# Patient Record
Sex: MALE | Race: WHITE | NOT HISPANIC OR LATINO | URBAN - NONMETROPOLITAN AREA
[De-identification: names, ages, dates, MRNs, and addresses within clinical notes are randomized per-mention and may not be internally consistent; named-entity substitution may affect disease eponyms.]

---

## 2015-11-05 PROBLEM — H52.12: Noted: 2019-10-03

## 2019-04-11 NOTE — PATIENT DISCUSSION
New Prescription: Maxitrol (neomycin-polymyxin-dexameth): ointment: 3.5-10,000-0.1 mg-unit/g-% a small amount three times a day as directed into left eye 04-

## 2019-10-03 ENCOUNTER — IMPORTED ENCOUNTER (OUTPATIENT)
Dept: URBAN - NONMETROPOLITAN AREA CLINIC 1 | Facility: CLINIC | Age: 64
End: 2019-10-03

## 2019-10-03 PROCEDURE — 92014 COMPRE OPH EXAM EST PT 1/>: CPT

## 2019-10-03 PROCEDURE — 92015 DETERMINE REFRACTIVE STATE: CPT

## 2019-10-03 NOTE — PATIENT DISCUSSION
PVD OU - Discussed findings of exam in detail with the patient. - The risk of retinal detachment in patients with PVDs was discussed with the patient and the warning signs of retinal detachment were carefully reviewed with the patient. - The patient was warned to return to the office or contact the ophthalmologist on call immediately if they experience signs of retinal detachment or changes noted in vision from today.  - Continue to monitor Cataracts OU - Discussed diagnosis in detail with patient- Discussed signs and symptoms associated - Recommend UV protection - Not visually significant at this time no treatment needed- Continue to monitor Myopia/Astigmatism/Presbyopia OU- Discussed refractive status in detail with patient- New glasses Rx given today- Continue to monitor; 's Notes: MR 10/03/2019

## 2020-10-06 NOTE — PATIENT DISCUSSION
- Follow up in 1 year for [facility] SERVICES OF [facility], MRx

## 2021-07-12 ENCOUNTER — IMPORTED ENCOUNTER (OUTPATIENT)
Dept: URBAN - NONMETROPOLITAN AREA CLINIC 1 | Facility: CLINIC | Age: 66
End: 2021-07-12

## 2021-07-12 PROCEDURE — 92015 DETERMINE REFRACTIVE STATE: CPT

## 2021-07-12 PROCEDURE — 92014 COMPRE OPH EXAM EST PT 1/>: CPT

## 2021-07-12 NOTE — PATIENT DISCUSSION
Cataracts OU -  Discussed diagnosis in detail with patient-  Discussed signs and symptoms associated -  Recommend UV protection -  Not visually significant at this time no treatment needed-  Continue to monitor yearly or prnDES OU-  discussed findings w/patient-  stable findings at this time-  continue AT's at least BID OU-  RTC 1 year or prnPVD OU -  Discussed findings of exam in detail with the patient. -  The risk of retinal detachment in patients with PVDs was discussed with the patient and the warning signs of retinal detachment were carefully reviewed with the patient. -  The patient was warned to return to the office or contact the ophthalmologist on call immediately if they experience signs of retinal detachment or changes noted in vision from today.  -  Continue to monitor yearly or prnMixed Astigmatism OU w/Presbyopia-  discussed findings w/patient-  new spectacle Rx issued-  continue to monitor yearly or prn; 's Notes:  7/12/2021DFE 7/12/2021

## 2021-08-01 PROBLEM — H52.223: Noted: 2021-08-01

## 2021-08-01 PROBLEM — H43.813: Noted: 2021-08-01

## 2021-08-01 PROBLEM — H52.03: Noted: 2021-08-01

## 2021-08-01 PROBLEM — H52.4: Noted: 2021-08-01

## 2022-04-09 ASSESSMENT — TONOMETRY
OD_IOP_MMHG: 18
OD_IOP_MMHG: 19
OS_IOP_MMHG: 20
OS_IOP_MMHG: 19

## 2022-04-09 ASSESSMENT — VISUAL ACUITY
OS_SC: 20/20-2
OD_SC: 20/20-1
OD_SC: 20/20-
OS_SC: 20/20-2